# Patient Record
Sex: FEMALE | Race: WHITE | Employment: UNEMPLOYED | URBAN - METROPOLITAN AREA
[De-identification: names, ages, dates, MRNs, and addresses within clinical notes are randomized per-mention and may not be internally consistent; named-entity substitution may affect disease eponyms.]

---
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
## 2020-03-23 ENCOUNTER — VIRTUAL VISIT (OUTPATIENT)
Dept: FAMILY MEDICINE | Facility: CLINIC | Age: 20
End: 2020-03-23

## 2020-03-23 DIAGNOSIS — J03.90 TONSILLITIS: Primary | ICD-10-CM

## 2020-03-23 PROCEDURE — 99203 OFFICE O/P NEW LOW 30 MIN: CPT | Mod: 95 | Performed by: FAMILY MEDICINE

## 2020-03-23 RX ORDER — ETONOGESTREL AND ETHINYL ESTRADIOL VAGINAL RING .015; .12 MG/D; MG/D
RING VAGINAL
COMMUNITY
Start: 2019-08-13 | End: 2020-08-12

## 2020-03-23 RX ORDER — AZITHROMYCIN 250 MG/1
TABLET, FILM COATED ORAL
Qty: 6 TABLET | Refills: 1 | Status: SHIPPED | OUTPATIENT
Start: 2020-03-23

## 2020-03-23 NOTE — PROGRESS NOTES
"  SUBJECTIVE:                                                    Love Dill is a 19 year old female who is being evaluated via a billable telephone visit.    Chief Complaint   Patient presents with     Pharyngitis       Please use the information at the end of this document to sign up for [employer] where you can get your results and a message about those results sent to you through the [employer] application. If you do not have mychart we will call you with your results but it may take longer.        Symptoms 1 week. States tight throat, sore throat, trouble swallowing, chest tightness (in the Am), somewhat productive cough (yellow phlegm. Worse in morning and again toward 8 pm), right ear pain, sinus congestion/drainage, right sided jaw pain. Has tried  IBU and increased fluid intake. States not helpful.               The patient has been notified of following:     \"This telephone visit will be conducted via a call between you and your physician/provider. We have found that certain health care needs can be provided without the need for a physical exam.  This service lets us provide the care you need with a short phone conversation.  If a prescription is necessary we can send it directly to your pharmacy.  If lab work is needed we can place an order for that and you can then stop by our lab to have the test done at a later time.    If during the course of the call the physician/provider feels a telephone visit is not appropriate, you will not be charged for this service.\"     I have reviewed and updated the patient's Past Medical History, Social History, Family History and Medication List.  Meredith BOOKER CMA        Problem list and histories reviewed & adjusted, as indicated.  Additional history: as documented    Patient Active Problem List   Diagnosis     UTI (urinary tract infection)     FAM HX-CARDIOVAS DIS NEC     Past Surgical History:   Procedure Laterality Date     ARTHROSCOPY ANKLE Left " 5/16/2016    Procedure: ARTHROSCOPY ANKLE;  Surgeon: Jeffrey Clarke DPM;  Location: [facility] OR     BUNIONECTOMY Left 5/16/2016    Procedure: BUNIONECTOMY;  Surgeon: Jeffrey Clarke DPM;  Location: [facility] OR     PE TUBES       REMOVE HARDWARE FOOT Left 3/6/2017    Procedure: REMOVE HARDWARE FOOT;  Surgeon: Jeffrey Clarke DPM;  Location: [facility] OR       Social History     Tobacco Use     Smoking status: Never Smoker     Smokeless tobacco: Never Used   Substance Use Topics     Alcohol use: Yes     Comment: moderate     Family History   Problem Relation Age of Onset     Heart Disease Maternal Grandfather      Diabetes Maternal Grandfather          Current Outpatient Medications   Medication Sig Dispense Refill     azithromycin (ZITHROMAX) 250 MG tablet Two tablets first day, then one tablet daily for four days. 6 tablet 1     Multiple Vitamin (MULTI VITAMIN DAILY PO)        VITAMIN D, CHOLECALCIFEROL, PO Take by mouth daily       etonogestrel-ethinyl estradiol (NUVARING) 0.12-0.015 MG/24HR vaginal ring Place vaginally every 28 days       Allergies   Allergen Reactions     Nka [No Known Allergies]      Recent Labs   Lab Test 01/16/16  1850 01/13/16  1910   ALT 17 17   CR 0.73 0.70   GFRESTIMATED >90  Non  GFR Calc   >90  Non  GFR Calc     GFRESTBLACK >90   GFR Calc   >90   GFR Calc     POTASSIUM 3.8 3.9      BP Readings from Last 3 Encounters:   03/06/17 (P) 108/69   10/29/16 107/64   05/16/16 107/54 (30 %/ 6 %)*     *BP percentiles are based on the 2017 AAP Clinical Practice Guideline for girls    Wt Readings from Last 3 Encounters:   05/16/16 68 kg (150 lb) (85 %)*   04/22/16 72.1 kg (159 lb) (90 %)*   01/13/16 68 kg (150 lb) (86 %)*     * Growth percentiles are based on CDC (Girls, 2-20 Years) data.         ROS:  Constitutional, HEENT, cardiovascular, pulmonary, gi and gu systems are negative, except as otherwise noted.    OBJECTIVE:                                                     There were no vitals taken for this visit.  Phone visit  Diagnostic Test Results:  none      Phone call duration:  15 minutes      ASSESSMENT/PLAN:                                                    1. Tonsillitis  Viral vs bacterial.  Trial of antibiotics but if not effective viral infection and self limiting.  If effective and recurs will repeat, see patient instructions.  - azithromycin (ZITHROMAX) 250 MG tablet; Two tablets first day, then one tablet daily for four days.  Dispense: 6 tablet; Refill: 1    Allison Arriaza MD  [facility]

## (undated) DEVICE — SOL NACL 0.9% IRRIG 1000ML BOTTLE 07138-09

## (undated) DEVICE — CAST PADDING 4" STERILE 9044S

## (undated) DEVICE — DRAPE SHEET REV FOLD 3/4 9349

## (undated) DEVICE — GLOVE PROTEXIS POWDER FREE 8.0 ORTHOPEDIC 2D73ET80

## (undated) DEVICE — SU ETHILON 3-0 FS-1 18" 669H

## (undated) DEVICE — GOWN LG DISP 9515

## (undated) DEVICE — BNDG ELASTIC 4"X5YDS UNSTERILE 6611-40

## (undated) DEVICE — PREP CHLORAPREP 26ML TINTED ORANGE  260815

## (undated) DEVICE — GLOVE PROTEXIS POWDER FREE 7.5 ORTHOPEDIC 2D73ET75

## (undated) DEVICE — PACK EXTREMITY LATEX FREE SOP32HFFCS

## (undated) DEVICE — DRSG GAUZE 4X4" TRAY

## (undated) DEVICE — DRAPE EXTREMITY W/ARMBOARD 29405

## (undated) RX ORDER — BUPIVACAINE HYDROCHLORIDE 5 MG/ML
INJECTION, SOLUTION PERINEURAL
Status: DISPENSED
Start: 2017-03-06

## (undated) RX ORDER — FENTANYL CITRATE 50 UG/ML
INJECTION, SOLUTION INTRAMUSCULAR; INTRAVENOUS
Status: DISPENSED
Start: 2017-03-06

## (undated) RX ORDER — LIDOCAINE HYDROCHLORIDE 20 MG/ML
INJECTION, SOLUTION INFILTRATION; PERINEURAL
Status: DISPENSED
Start: 2017-03-06

## (undated) RX ORDER — PROPOFOL 10 MG/ML
INJECTION, EMULSION INTRAVENOUS
Status: DISPENSED
Start: 2017-03-06